# Patient Record
Sex: MALE | Race: WHITE | NOT HISPANIC OR LATINO
[De-identification: names, ages, dates, MRNs, and addresses within clinical notes are randomized per-mention and may not be internally consistent; named-entity substitution may affect disease eponyms.]

---

## 2017-09-20 PROBLEM — Z00.00 ENCOUNTER FOR PREVENTIVE HEALTH EXAMINATION: Status: ACTIVE | Noted: 2017-09-20

## 2017-10-17 ENCOUNTER — APPOINTMENT (OUTPATIENT)
Dept: PLASTIC SURGERY | Facility: CLINIC | Age: 57
End: 2017-10-17
Payer: COMMERCIAL

## 2017-10-17 VITALS
SYSTOLIC BLOOD PRESSURE: 110 MMHG | TEMPERATURE: 98.3 F | BODY MASS INDEX: 19.99 KG/M2 | HEIGHT: 65 IN | DIASTOLIC BLOOD PRESSURE: 67 MMHG | WEIGHT: 120 LBS | HEART RATE: 63 BPM

## 2017-10-17 DIAGNOSIS — Z83.3 FAMILY HISTORY OF DIABETES MELLITUS: ICD-10-CM

## 2017-10-17 DIAGNOSIS — G60.0 HEREDITARY MOTOR AND SENSORY NEUROPATHY: ICD-10-CM

## 2017-10-17 DIAGNOSIS — Z80.3 FAMILY HISTORY OF MALIGNANT NEOPLASM OF BREAST: ICD-10-CM

## 2017-10-17 DIAGNOSIS — Z82.0 FAMILY HISTORY OF EPILEPSY AND OTHER DISEASES OF THE NERVOUS SYSTEM: ICD-10-CM

## 2017-10-17 PROCEDURE — 99243 OFF/OP CNSLTJ NEW/EST LOW 30: CPT

## 2017-10-18 PROBLEM — Z82.0 FAMILY HISTORY OF PARKINSON'S DISEASE: Status: ACTIVE | Noted: 2017-10-17

## 2017-10-18 PROBLEM — G60.0 CHARCOT-MARIE-TOOTH SYNDROME: Status: ACTIVE | Noted: 2017-10-17

## 2017-10-18 PROBLEM — Z83.3 FAMILY HISTORY OF DIABETES MELLITUS: Status: ACTIVE | Noted: 2017-10-17

## 2017-10-18 PROBLEM — Z80.3 FAMILY HISTORY OF MALIGNANT NEOPLASM OF BREAST: Status: ACTIVE | Noted: 2017-10-17

## 2017-10-18 RX ORDER — ALPHA LIPOIC ACID 50 MG
300 CAPSULE ORAL
Refills: 0 | Status: ACTIVE | COMMUNITY

## 2017-10-18 RX ORDER — GABAPENTIN 600 MG/1
600 TABLET, COATED ORAL
Refills: 0 | Status: ACTIVE | COMMUNITY

## 2017-10-31 ENCOUNTER — LABORATORY RESULT (OUTPATIENT)
Age: 57
End: 2017-10-31

## 2017-10-31 ENCOUNTER — APPOINTMENT (OUTPATIENT)
Dept: PLASTIC SURGERY | Facility: CLINIC | Age: 57
End: 2017-10-31
Payer: COMMERCIAL

## 2017-10-31 PROCEDURE — 11442 EXC FACE-MM B9+MARG 1.1-2 CM: CPT

## 2017-10-31 PROCEDURE — 13131 CMPLX RPR F/C/C/M/N/AX/G/H/F: CPT

## 2017-11-07 ENCOUNTER — APPOINTMENT (OUTPATIENT)
Dept: PLASTIC SURGERY | Facility: CLINIC | Age: 57
End: 2017-11-07
Payer: COMMERCIAL

## 2017-11-07 ENCOUNTER — LABORATORY RESULT (OUTPATIENT)
Age: 57
End: 2017-11-07

## 2017-11-07 PROCEDURE — 13131 CMPLX RPR F/C/C/M/N/AX/G/H/F: CPT | Mod: 59

## 2017-11-07 PROCEDURE — 21555 EXC NECK LES SC < 3 CM: CPT

## 2017-11-14 ENCOUNTER — APPOINTMENT (OUTPATIENT)
Dept: PLASTIC SURGERY | Facility: CLINIC | Age: 57
End: 2017-11-14
Payer: COMMERCIAL

## 2017-11-14 DIAGNOSIS — Q82.5 CONGENITAL NON-NEOPLASTIC NEVUS: ICD-10-CM

## 2017-11-14 PROCEDURE — 99024 POSTOP FOLLOW-UP VISIT: CPT

## 2017-11-14 RX ORDER — CHLORHEXIDINE GLUCONATE, 0.12% ORAL RINSE 1.2 MG/ML
0.12 SOLUTION DENTAL
Qty: 473 | Refills: 0 | Status: ACTIVE | COMMUNITY
Start: 2017-02-09

## 2017-11-14 RX ORDER — ALFUZOSIN HYDROCHLORIDE 10 MG/1
10 TABLET, EXTENDED RELEASE ORAL
Qty: 30 | Refills: 0 | Status: ACTIVE | COMMUNITY
Start: 2017-10-03

## 2017-12-19 ENCOUNTER — APPOINTMENT (OUTPATIENT)
Dept: PLASTIC SURGERY | Facility: CLINIC | Age: 57
End: 2017-12-19
Payer: COMMERCIAL

## 2017-12-19 ENCOUNTER — LABORATORY RESULT (OUTPATIENT)
Age: 57
End: 2017-12-19

## 2017-12-19 VITALS
HEIGHT: 65 IN | DIASTOLIC BLOOD PRESSURE: 70 MMHG | SYSTOLIC BLOOD PRESSURE: 111 MMHG | BODY MASS INDEX: 19.99 KG/M2 | TEMPERATURE: 97.7 F | WEIGHT: 120 LBS | HEART RATE: 60 BPM

## 2017-12-19 PROCEDURE — 11442 EXC FACE-MM B9+MARG 1.1-2 CM: CPT | Mod: 79

## 2017-12-19 PROCEDURE — 13131 CMPLX RPR F/C/C/M/N/AX/G/H/F: CPT | Mod: 79

## 2017-12-27 ENCOUNTER — APPOINTMENT (OUTPATIENT)
Dept: PLASTIC SURGERY | Facility: CLINIC | Age: 57
End: 2017-12-27
Payer: COMMERCIAL

## 2017-12-27 DIAGNOSIS — Q82.5 CONGENITAL NON-NEOPLASTIC NEVUS: ICD-10-CM

## 2017-12-27 PROCEDURE — 99024 POSTOP FOLLOW-UP VISIT: CPT

## 2023-02-24 ENCOUNTER — APPOINTMENT (OUTPATIENT)
Dept: ORTHOPEDIC SURGERY | Facility: CLINIC | Age: 63
End: 2023-02-24
Payer: COMMERCIAL

## 2023-02-24 VITALS — HEIGHT: 65 IN | BODY MASS INDEX: 19.99 KG/M2 | WEIGHT: 120 LBS

## 2023-02-24 DIAGNOSIS — C80.1 MALIGNANT (PRIMARY) NEOPLASM, UNSPECIFIED: ICD-10-CM

## 2023-02-24 DIAGNOSIS — E78.00 PURE HYPERCHOLESTEROLEMIA, UNSPECIFIED: ICD-10-CM

## 2023-02-24 PROCEDURE — 73030 X-RAY EXAM OF SHOULDER: CPT | Mod: RT

## 2023-02-24 PROCEDURE — 20610 DRAIN/INJ JOINT/BURSA W/O US: CPT

## 2023-02-24 PROCEDURE — 99204 OFFICE O/P NEW MOD 45 MIN: CPT | Mod: 25

## 2023-02-25 NOTE — IMAGING
[Right] : right shoulder [There are no fractures, subluxations or dislocations. No significant abnormalities are seen] : There are no fractures, subluxations or dislocations. No significant abnormalities are seen [Fracture] : Fracture [No bony abnormalities] : No bony abnormalities [Type 2 acromion] : Type 2 acromion

## 2023-02-25 NOTE — HISTORY OF PRESENT ILLNESS
[Sudden] : sudden [8] : 8 [Intermittent] : intermittent [Household chores] : household chores [Leisure] : leisure [Work] : work [Sleep] : sleep [Nothing helps with pain getting better] : Nothing helps with pain getting better [] : no [FreeTextEntry3] : 01/01/2023 [FreeTextEntry7] : right shoulder downward to biscep/wrist; at times into chest [FreeTextEntry5] : PT states he hurt his right shoulder 2 months ago.  Pt states he threw items from the front of the car to the back of the car and believes this may have caused his injury.   [de-identified] : Reaching Back

## 2023-02-25 NOTE — PROCEDURE
[Large Joint Injection] : Large joint injection [Right] : of the right [Subacromial Space] : subacromial space [Pain] : pain [Inflammation] : inflammation [Alcohol] : alcohol [Betadine] : betadine [Ethyl Chloride sprayed topically] : ethyl chloride sprayed topically [Sterile technique used] : sterile technique used [___ cc    1%] : Lidocaine ~Vcc of 1%  [___ cc    40mg] : Methylprednisolone (Depomedrol) ~Vcc of 40 mg  [] : Patient tolerated procedure well [Call if redness, pain or fever occur] : call if redness, pain or fever occur [Apply ice for 15min out of every hour for the next 12-24 hours as tolerated] : apply ice for 15 minutes out of every hour for the next 12-24 hours as tolerated [Risks, benefits, alternatives discussed / Verbal consent obtained] : the risks benefits, and alternatives have been discussed, and verbal consent was obtained

## 2023-02-25 NOTE — DISCUSSION/SUMMARY
[de-identified] : Alternatives of treatment were discussed, including benefits and risks of each. I recommended SA CSI  for the impingement problem .\par This was performed today, with a good lidocaine response.

## 2023-02-25 NOTE — PHYSICAL EXAM
[de-identified] : At abduction the forearm reaches vertical.  very positive impingement sign.  ER at 0 abduction is at 80 ood strength with degrees.  Neers Test is negative there is no internal impingement sign.  And Adduction Range is full.  Tender over the greater tuberosity. Speed's test negative. Cuff strength testing shows excellent strength of all cuff muscles, except Supraspinatus, which has very good strength, with significant pain.

## 2023-03-07 ENCOUNTER — TRANSCRIPTION ENCOUNTER (OUTPATIENT)
Age: 63
End: 2023-03-07

## 2023-03-14 ENCOUNTER — APPOINTMENT (OUTPATIENT)
Dept: ORTHOPEDIC SURGERY | Facility: CLINIC | Age: 63
End: 2023-03-14
Payer: COMMERCIAL

## 2023-03-14 VITALS — WEIGHT: 120 LBS | BODY MASS INDEX: 19.99 KG/M2 | HEIGHT: 65 IN

## 2023-03-14 DIAGNOSIS — M67.911 UNSPECIFIED DISORDER OF SYNOVIUM AND TENDON, RIGHT SHOULDER: ICD-10-CM

## 2023-03-14 PROCEDURE — 99213 OFFICE O/P EST LOW 20 MIN: CPT

## 2023-03-14 NOTE — DISCUSSION/SUMMARY
[de-identified] : 1) Conservative treatment through injection and activity modification has failed.\par 2)Demonstrated and recommended wall climbing exercises, and IR ROM exercise.  I discussed the importance of maintaining ROM through stretching and exercise.  Exercises should be performed daily.\par 3) **MRI will be performed to evaluate RT shoulder rotator cuff. \par 4) Pt will continue with activity modification and home exercise as tolerated.\par \par \par The patient will continue with conservative treatment as described above, and will F/U after MRI.\par \par \par The patient was advised of the diagnosis.  The natural history of the pathology was explained in full to the patient in layman's terms, including but not limited to the risks, symptoms and available options for treatment.  We discussed the risks, benefits and alternatives of the treatment options and the advice I provided to the patient as listed above.  Pt was given the opportunity to ask questions, and all questions were answered.  The discussion was not limited to the above.\par \par Entered by Rory Naranjo acting as scribe.

## 2023-03-14 NOTE — PHYSICAL EXAM
[de-identified] : At abduction the forearm reaches vertical.  very positive impingement sign.  ER at 0 abduction is at 80 ood strength with degrees.  Neers Test is negative there is no internal impingement sign.  And Adduction Range is full.  Tender over the greater tuberosity. Speed's test negative. Cuff strength testing shows excellent strength of all cuff muscles, except Supraspinatus, which has very good strength, with significant pain.

## 2023-03-14 NOTE — IMAGING
[Right] : right shoulder [There are no fractures, subluxations or dislocations. No significant abnormalities are seen] : There are no fractures, subluxations or dislocations. No significant abnormalities are seen [Fracture] : Fracture [No bony abnormalities] : No bony abnormalities [Type 2 acromion] : Type 2 acromion [de-identified] : RT Shoulder Exam: At abduction the forearm reaches vertical.  Very positive impingement sign.  ER at 0 abduction is at 80.  Neers test is negative, and there is no internal impingement sign.  Adduction range is full.  Tender over the greater tuberosity.  Speed's test is negative.  Cuff strength testing shows excellent strength of all cuff muscles, except supraspinatus which has very good strength accompanied by significant pain.\par \par X-ray of the RT shoulder (2 views) on 02/24/2023: Type 2 acromion.  No GH migration.  No fractures, dislocations or subluxations.  No bone lesions.

## 2023-03-14 NOTE — HISTORY OF PRESENT ILLNESS
[Sudden] : sudden [8] : 8 [Intermittent] : intermittent [Household chores] : household chores [Leisure] : leisure [Work] : work [Sleep] : sleep [Nothing helps with pain getting better] : Nothing helps with pain getting better [de-identified] : 2/24/23: Right Shoulder SA CSI injection\par PT states he hurt his right shoulder 2 months ago.  Pt states he reached back to throw items from the front of the car to the back of the car and believes this may have caused his injury.  \par \par 03/14/2023: Right Shoulder\par Pt reports that RT shoulder SA CSI injection did not provide any relief, not even for a few days.  Pt continues to have bothersome pain in the RT shoulder.  Pt C/O pain reaching upwards to a kitchen cabinet.  Pt has pain with reaching behind his back. [] : no [FreeTextEntry1] : Right Shoulder [FreeTextEntry3] : 01/01/2023 [FreeTextEntry7] : right shoulder downward to biscep/wrist; at times into chest [de-identified] : Reaching Back

## 2023-03-16 ENCOUNTER — FORM ENCOUNTER (OUTPATIENT)
Age: 63
End: 2023-03-16

## 2023-03-17 ENCOUNTER — APPOINTMENT (OUTPATIENT)
Dept: MRI IMAGING | Facility: CLINIC | Age: 63
End: 2023-03-17
Payer: COMMERCIAL

## 2023-03-17 PROCEDURE — 73221 MRI JOINT UPR EXTREM W/O DYE: CPT | Mod: RT

## 2023-04-04 ENCOUNTER — APPOINTMENT (OUTPATIENT)
Dept: ORTHOPEDIC SURGERY | Facility: CLINIC | Age: 63
End: 2023-04-04
Payer: COMMERCIAL

## 2023-04-04 VITALS — HEIGHT: 65 IN | BODY MASS INDEX: 19.99 KG/M2 | WEIGHT: 120 LBS

## 2023-04-04 PROCEDURE — 99214 OFFICE O/P EST MOD 30 MIN: CPT | Mod: 25

## 2023-04-04 PROCEDURE — 20610 DRAIN/INJ JOINT/BURSA W/O US: CPT

## 2023-04-04 NOTE — ASSESSMENT
[FreeTextEntry1] : ROM deficits \par The exam which included well localized bicipital groove tenderness and pain with passive stretching of the tendon during shoulder extension is consistent with this diagnosis. The MRI suggests no other pain source. There was not even momentary relief from a SA injection from the last visit. Following the injection to the right bicipital groove, there was an excellent response to lidocaine, and the pain that had been present with full right shoulder extension is no longer present.

## 2023-04-04 NOTE — DATA REVIEWED
[MRI] : MRI [Right] : of the right [Shoulder] : shoulder [Report was reviewed and noted in the chart] : The report was reviewed and noted in the chart [I independently reviewed and interpreted images and report] : I independently reviewed and interpreted images and report [I reviewed the films/CD] : I reviewed the films/CD

## 2023-04-04 NOTE — PROCEDURE
[FreeTextEntry3] : Large joint injection was performed on the Right bicipital groove. The indication for this procedure was pain and inflammation. The site was prepped with alcohol, betadine, ethyl chloride sprayed topically and sterile technique used. An injection of Lidocaine 2cc of 1% , Methylprednisolone (Depomedrol) 1cc of 40 mg  was used. \par Patient tolerated procedure well. Patient was advised to call if redness, pain or fever occur and apply ice for 15 minutes out of every hour for the next 12-24 hours as tolerated. \par \par Patient has tried OTC's including aspirin, Ibuprofen, Aleve, etc or prescription NSAIDS, and/or exercises at home and/or physical therapy without satisfactory response, patient had decreased mobility in the joint and the risks benefits, and alternatives have been discussed, and verbal consent was obtained.

## 2023-04-04 NOTE — DISCUSSION/SUMMARY
[Medication Risks Reviewed] : Medication risks reviewed [de-identified] : 1) I reviewed and discussed the MRI results for the Right Shoulder with the patient.\par 2) I discussed the risks, benefits and alternatives of treatment options for the right bicipital groove, including activity modification, rest, home exercise, oral antiinflammatory medication, physical therapy.\par 3) Conservative treatment through injection and activity modification has failed.\par 4) Demonstrated and recommended wall climbing exercises, and IR ROM exercise.  I discussed the importance of maintaining ROM through stretching and exercise.  Exercises should be performed daily.\par 5) Pt will continue with activity modification and home exercise as tolerated.\par 6)** CSI administered to the right bicipital groove**\par \par \par The patient will continue with conservative treatment as described above, and will F/U in 1 month.\par \par \par The patient was advised of the diagnosis.  The natural history of the pathology was explained in full to the patient in layman's terms, including but not limited to the risks, symptoms and available options for treatment.  We discussed the risks, benefits and alternatives of the treatment options and the advice I provided to the patient as listed above.  Pt was given the opportunity to ask questions, and all questions were answered.  The discussion was not limited to the above.\par \par Entered by Shawnee Espitia acting as scribe.

## 2023-04-04 NOTE — HISTORY OF PRESENT ILLNESS
[Sudden] : sudden [8] : 8 [Intermittent] : intermittent [Household chores] : household chores [Leisure] : leisure [Work] : work [Sleep] : sleep [Nothing helps with pain getting better] : Nothing helps with pain getting better [de-identified] : 2/24/23: Right Shoulder SA CSI injection\par PT states he hurt his right shoulder 2 months ago.  Pt states he reached back to throw items from the front of the car to the back of the car and believes this may have caused his injury.  \par \par 03/14/2023: Right Shoulder\par Pt reports that RT shoulder SA CSI injection did not provide any relief, not even for a few days.  Pt continues to have bothersome pain in the RT shoulder.  Pt C/O pain reaching upwards to a kitchen cabinet.  Pt has pain with reaching behind his back.\par \par 04/04/2023: Right Shoulder\par Pt is here today for a follow up to review MRI results of the right shoulder. Pt reports that he is okay unless performing certain motions and positions that will cause him pain. The pain travels down his arm into the right side of the chest. He also notes that he is not attending physical therapy.\par \par *** MRI of the Right Shoulder performed on 03/17/23***\par IMPRESSION: \par 1. Diffuse tearing throughout the superior anterior labrum extending into the inferior labrum anteriorly with \par moderate glenohumeral joint effusion, synovitis, and capsulitis.\par 2. Mild supraspinatus tendinopathy, mild subscapularis tendinopathy, mild biceps tenosynovitis, and mild AC \par joint arthrosis with mild lateral acromial bone spurs and slight subacromial bursitis.\par \par **CSI administered today to the right bicipital groove*** [] : no [FreeTextEntry1] : Right Shoulder [FreeTextEntry3] : 01/01/2023 [FreeTextEntry5] : PT states he does not attend physical therapy.  Pt states he is fine as long as he does not move his shoulder in certain positions.  Pt goes down into his right arm into his right side chest.   [FreeTextEntry7] : right shoulder downward to biscep/wrist; at times into chest [de-identified] : Reaching Back

## 2023-04-04 NOTE — IMAGING
[de-identified] : RT Shoulder Exam: The pain can be reproduces with full adduction and internal rotation. The pain is then present anteriorly and is still somewhat diffuse. Firm palpation of the AC joint from the superior aspect and anterior aspect produces no pain at all. A tender point was found which is located right over the bicipital groove. There is no tenderness over the lesser tuberosity. Speed's test remains negative.\par \par X-ray of the RT shoulder (2 views) on 02/24/2023: Type 2 acromion.  No GH migration.  No fractures, dislocations or subluxations.  No bone lesions.

## 2023-05-05 ENCOUNTER — APPOINTMENT (OUTPATIENT)
Dept: ORTHOPEDIC SURGERY | Facility: CLINIC | Age: 63
End: 2023-05-05
Payer: COMMERCIAL

## 2023-05-05 VITALS — WEIGHT: 120 LBS | BODY MASS INDEX: 19.99 KG/M2 | HEIGHT: 65 IN

## 2023-05-05 DIAGNOSIS — M75.41 IMPINGEMENT SYNDROME OF RIGHT SHOULDER: ICD-10-CM

## 2023-05-05 DIAGNOSIS — M75.21 BICIPITAL TENDINITIS, RIGHT SHOULDER: ICD-10-CM

## 2023-05-05 PROCEDURE — 99213 OFFICE O/P EST LOW 20 MIN: CPT

## 2023-05-05 NOTE — IMAGING
[de-identified] : RT Shoulder Exam: The pain can be reproduces with full adduction and internal rotation. The pain is then present anteriorly and is still somewhat diffuse. Firm palpation of the AC joint from the superior aspect and anterior aspect produces no pain at all. A tender point was found which is located right over the bicipital groove. There is no tenderness over the lesser tuberosity. Speed's test remains negative.\par \par X-ray of the RT shoulder (2 views) on 02/24/2023: Type 2 acromion.  No GH migration.  No fractures, dislocations or subluxations.  No bone lesions.

## 2023-05-05 NOTE — DISCUSSION/SUMMARY
[Medication Risks Reviewed] : Medication risks reviewed [de-identified] : 1) I discussed the risks, benefits and alternatives of treatment options for the right biceps, including activity modification, rest, home exercise, oral antiinflammatory medication, physical therapy, surgery.\par 2) Conservative treatment through injection and activity modification has failed.\par 3) Demonstrated and recommended wall climbing exercises, and IR ROM exercise.  I discussed the importance of maintaining ROM through stretching and exercise.  Exercises should be performed daily.\par 4) Pt will continue with activity modification and home exercise as tolerated.\par 5)  I recommend the patient take 1 tablet of OTC Tylenol ES (500mg) with 2 tablets of OTC Ibuprofen (400mg total).  This combination of medication may be taken 1BID PRN, preferred with food. \par \par \par The patient will continue with conservative treatment as described above, and will F/U PRN.\par \par \par The patient was advised of the diagnosis.  The natural history of the pathology was explained in full to the patient in layman's terms, including but not limited to the risks, symptoms and available options for treatment.  We discussed the risks, benefits and alternatives of the treatment options and the advice I provided to the patient as listed above.  Pt was given the opportunity to ask questions, and all questions were answered.  The discussion was not limited to the above.\par \par Entered by Shawnee Espitia acting as scribe.

## 2023-05-05 NOTE — HISTORY OF PRESENT ILLNESS
[Sudden] : sudden [8] : 8 [Intermittent] : intermittent [Household chores] : household chores [Leisure] : leisure [Work] : work [Sleep] : sleep [Nothing helps with pain getting better] : Nothing helps with pain getting better [de-identified] : 2/24/23: Right Shoulder SA CSI injection\par PT states he hurt his right shoulder 2 months ago.  Pt states he reached back to throw items from the front of the car to the back of the car and believes this may have caused his injury.  \par \par 03/14/2023: Right Shoulder\par Pt reports that RT shoulder SA CSI injection did not provide any relief, not even for a few days.  Pt continues to have bothersome pain in the RT shoulder.  Pt C/O pain reaching upwards to a kitchen cabinet.  Pt has pain with reaching behind his back.\par \par 04/04/2023: Right Shoulder\par Pt is here today for a follow up to review MRI results of the right shoulder. Pt reports that he is okay unless performing certain motions and positions that will cause him pain. The pain travels down his arm into the right side of the chest. He also notes that he is not attending physical therapy.\par \par *** MRI of the Right Shoulder performed on 03/17/23***\par IMPRESSION: \par 1. Diffuse tearing throughout the superior anterior labrum extending into the inferior labrum anteriorly with \par moderate glenohumeral joint effusion, synovitis, and capsulitis.\par 2. Mild supraspinatus tendinopathy, mild subscapularis tendinopathy, mild biceps tenosynovitis, and mild AC \par joint arthrosis with mild lateral acromial bone spurs and slight subacromial bursitis.\par \par **CSI administered today to the right bicipital groove***\par \par 05/05/2023: Right Shoulder\par Pt reports that the previous CSI offered about five days of relief before the pain returned to what it was originally. However, he states that the pain now seems that it has started to improve, and is better than what it initially was. [] : no [FreeTextEntry1] : Right Shoulder [FreeTextEntry3] : 01/01/2023 [FreeTextEntry5] : PT states he does not attend physical therapy.  Pt states he is fine as long as he does not move his shoulder in certain positions.  Pt goes down into his right arm into his right side chest.  \par 05/05/2023 Pt states he feels slightly better in his right shoulder  [FreeTextEntry7] : right shoulder downward to biscep/wrist; at times into chest [de-identified] : Reaching Back

## 2024-08-02 ENCOUNTER — APPOINTMENT (OUTPATIENT)
Dept: ORTHOPEDIC SURGERY | Facility: CLINIC | Age: 64
End: 2024-08-02
Payer: COMMERCIAL

## 2024-08-02 VITALS — WEIGHT: 120 LBS | BODY MASS INDEX: 19.99 KG/M2 | HEIGHT: 65 IN

## 2024-08-02 DIAGNOSIS — M16.11 UNILATERAL PRIMARY OSTEOARTHRITIS, RIGHT HIP: ICD-10-CM

## 2024-08-02 PROCEDURE — 99213 OFFICE O/P EST LOW 20 MIN: CPT

## 2024-08-02 PROCEDURE — 73502 X-RAY EXAM HIP UNI 2-3 VIEWS: CPT
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
## 2024-08-02 NOTE — DISCUSSION/SUMMARY
[de-identified] : Plan for PT Discussed PRN use of prev dispensed Mobic Discussed if pain worsens he could f/u for possible CSI inj ----------------------------------------------------------------------------   All relevant imaging studies pertinent to today's visit, including x-rays, MRI's and/or other advanced imaging studies (CT/etc) were independently interpreted and reviewed with the patient as needed. Implications of the studies together with the patient's clinical picture were discussed to formulate a working diagnosis and management options were detailed.   The patient and/or guardian was advised of the diagnosis.  The natural history of the pathology was explained in full. All questions were answered.  The risks and benefits of conservative and interventional treatment alternatives were explained to the patient  The patient and/or guardian was advised if any advanced diagnostic/imaging study (MRI/CT/etc) is ordered to evaluate potential pathology in the affected area(s), they should follow up in the office to review the results of the study and determine further management that may be indicated.

## 2024-08-02 NOTE — IMAGING
[de-identified] :  ----------------------------------------------------------------------------   Right hip exam:   Inspection: no deformity, no swelling, no gross limb length discrepancy ROM:    Flexion: 100    ER: 40    IR: 5 Tenderness:    (neg) Groin tenderness    (neg) Greater trochanteric tenderness    (neg) Buttock tenderness    (neg) IT Band tenderness    (neg) Anterior thigh tenderness    (neg)ASIS tenderness    (neg) Ischial tuberosity    (neg) Hamstring muscle tenderness Additional tests:    (+) FADIR    (neg) DEJUAN    (neg) Resisted hip flexion pain    (neg) Apprehension (external rotation/extension)    (neg) Posterior pain with forced hip flexion and knee extension    (neg) Tight hamstrings    (neg) Log roll    (neg) Axial load Strength: 5/5 IP/Q/H/TA/GS/EHL Neuro: In tact to light touch throughout, DTR's wnl Vascularity: Extremity warm and well perfused Gait: normal.    [Right] : right hip with pelvis [There are no fractures, subluxations or dislocations. No significant abnormalities are seen] : There are no fractures, subluxations or dislocations. No significant abnormalities are seen [FreeTextEntry9] :  mild to moderate hip OA